# Patient Record
(demographics unavailable — no encounter records)

---

## 2024-11-18 NOTE — HISTORY OF PRESENT ILLNESS
[FreeTextEntry1] : He denies any chest pain. No sob. Not exercise.  Both knees give him pain. He needs L TKR. Not able exercise. .His ccta 10/24 LAD 30% Myocardial Bridging. was positive. Cardiac cath

## 2024-11-18 NOTE — REVIEW OF SYSTEMS
Instructions: This plan will send the code FBSD to the PM system.  DO NOT or CHANGE the price. Price (Do Not Change): 0.00 Detail Level: Simple [Fever] : no fever [Chills] : no chills [Blurry Vision] : no blurred vision [Earache] : no earache [Sore Throat] : no sore throat [SOB] : no shortness of breath [Chest Discomfort] : no chest discomfort [Palpitations] : no palpitations [Cough] : no cough [Wheezing] : no wheezing [Abdominal Pain] : no abdominal pain [Diarrhea] : diarrhea [Constipation] : no constipation [Knee Pain] : knee pain [Rash] : no rash [Skin Lesions] : no skin lesions [Dizziness] : no dizziness [Weakness] : no weakness [Confusion] : no confusion was observed [Swollen Glands] : no swollen glands

## 2024-11-18 NOTE — DISCUSSION/SUMMARY
[FreeTextEntry1] : H/o HTN . DSE 8/19 neg EF 55% MIld LVH. Grade 1 diastolic dysfunction. He may have LIANNA.  Told he needs  wt reduction. Told exercise when able .  Dr Hutchinson does blood. Had covid 12/22. He needs l TKR.  Last echo 1/24  EF 65% Ascending aorta 3.8 cm . EKG reviewed.   Legs swollen. Reviewed low na diet. He eats pizza cheese ETC. He does not snore.Told he needs  weight loss. CCTA positive cad. Cardiac Catherization  10/24. LAD 30  percent, with bridging. Cardiac risk ortho surgery intermediate. Bloods outside reviewed Time spent 35 minutes

## 2025-01-08 NOTE — HISTORY OF PRESENT ILLNESS
[FreeTextEntry1] : Quinn is a 63-year-old with history of kidney stones.  Last seen urology in 2018 after passing a 6 x 5 x 8 left ureteral stone.  He had nonobstructing stones as well at this time and a 1.5 cm left renal hypodensity that measures simple fluid, likely renal cyst.  Presents to office today to establish care and for his initial consultation for lower urinary tract symptoms and erectile dysfunction.  States that he has sensation of incomplete bladder emptying.  He denies any dysuria and gross hematuria.  Nocturia 1-3 times at night.  Prostate volume in 2018 was 39 cc.  Reports that he is able to get an erection but has difficulty maintaining.  States that his erectile dysfunction has been worsening over the last year.  He recently had cardiac workup including catheterization which showed a 30% blockage.  He continues to follow cardiology. Patient denies any chest pain or shortness of breath.  He denies being prescribed or taking any nitroglycerin medication.  PSA 1.8 ng/mL August 2024.

## 2025-01-08 NOTE — ASSESSMENT
[FreeTextEntry1] : Quinn is a 63-year-old with kidney stones, chronic BPH, chronic lower urinary tract symptoms, and chronic erectile dysfunction.  PSA reviewed.  Repeat in 1 year.  Options for ED reviewed.  Causes of ED reviewed.  Plan -Trial tadalafil 5 mg daily for BPH, LUTS, and erectile dysfunction.  Rx printed so patient can shop for cheaper out-of-pocket pharmacies have insurance does not cover. -Kidney bladder sonogram in office in 6 weeks same-day review to evaluate BPH, and kidney stones -Total testosterone ordered [Urinary Symptom or Sign (788.99\R39.89)] : implantation

## 2025-02-06 NOTE — HISTORY OF PRESENT ILLNESS
[de-identified] : s/p Left TKA doing well postop course uncomplicated, home PT complete, progressing appropriately, now ready for OP PT.  pain controlled (-)n/v/cp/sob  Left knee exam shows well healed incision NTTP AROM 2-90 negative nava  Imaging:  AP and Lateral views were obtained of the knees, including the contralateral knee for comparison. X-rays are negative for acute bone or soft tissue trauma. Left knee replacement in good alignment without radiographic evidence of complication.  Plan: continue home exercise activities as tolerated OP PT continue dvt prophylaxis f/u at 6 weeks.

## 2025-02-25 NOTE — HISTORY OF PRESENT ILLNESS
[FreeTextEntry1] : Patient with history of a left kidney cyst underwent new sonogram today showing bilateral kidney cysts 1 is simple in nature 1.5 cm on the right and on the left there is a lobulated appearing large cyst approximately 4.4 cm.  Lower urinary tract symptoms, sensation of incomplete bladder emptying.  He was started on tadalafil and does not perceive improvement.  Today's sonogram shows no significant PVR.  Prostate moderately enlarged 48 cc.  Difficulty maintaining erections.  He is taking daily tadalafil and has not been able to assess erections since starting it.  He is also taking it for lower urinary tract symptoms.  Testosterone is 194 which is low but his free testosterone is normal at 48.

## 2025-02-25 NOTE — ASSESSMENT
[FreeTextEntry1] : Lobulated cystic structure in the left kidney.  Will get CT scan pre and post IV contrast to better evaluate.  Return to office to discuss results.  Adequate bladder emptying on tadalafil 5 mg daily which he will continue. Moderate BPH is a chronic condition requiring longitudinal follow-up. Erectile dysfunction with low total testosterone.  Patient does not want to pursue any treatment of this.  He may pursue treatment after the CT scan is done. [Urinary Symptom or Sign (788.99\R39.89)] : implantation

## 2025-03-27 NOTE — HISTORY OF PRESENT ILLNESS
[de-identified] : Follow-up of left knee replacement.  Doing well.  Says the knee feels stiff but no significant pain.  Starting physical therapy.  On exam I feel that he could still use about 2 to 3 degrees of terminal extension.  Flexion seems to be good and improving.  The knee overall looks good, no signs of infection, no areas of pain.  Recommend continue PT and follow-up in April, at that point he can probably DC PT and return to work.

## 2025-04-02 NOTE — ASSESSMENT
[FreeTextEntry1] : Solid right renal lesion.  Will refer to Dr. Gwen Rae for consideration of robotic partial nephrectomy.  Will also get MRI of the liver to assess liver lesions and CT chest for small pulmonary nodules.  Lower urinary tract symptoms.  Continue tadalafil.  BPH.  Will continue to monitor PSA in the future along with symptoms. [Urinary Symptom or Sign (788.99\R39.89)] : implantation

## 2025-04-02 NOTE — HISTORY OF PRESENT ILLNESS
[FreeTextEntry1] : 63-year-old with recent sonogram showing bilateral renal cysts which on my review appeared lobulated.  CT kidneys pre and postcontrast were obtained showing a right lower to mid pole 3 cm solid tumor.  I reviewed the images.  He also has a left kidney cyst.  Also on the CT scan with some liver lesions and pulmonary nodules.  He has lower urinary tract symptoms being treated with tadalafil 5 mg daily.  There is no significant PVR on sonogram while on tadalafil and prostate is 48 cc enlarged.  History of erectile dysfunction.

## 2025-04-03 NOTE — ADDENDUM
[FreeTextEntry1] : Patient's note was transcribed with the assistance of a medical scribe under the supervision of Dr. Aguilera. I, Dr. Aguilera, have reviewed the patient's chart and agree that it aligns with my medical decisions. Bib Borjas, our scribe, also served as a chaperone for physical examination purposes.  The submitted E/M billing level for this visit reflects the total time spent on the day of the visit including face-to-face time spent with the patient, non-face-to-face review of medical records and relevant information, documentation, and asynchronous communication with the patient after a visit via phone, email, or patients EHR portal after the visit.  The medical records reviewed are either scanned into the chart or reviewed with the patient using a patients electronic medical records portal for patients with records not available to [facility] via electronic transmission platforms from other institutions and labs.  Time spend counseling and performing coordination of care was also included in determining the appropriate EM billing level.

## 2025-04-03 NOTE — PHYSICAL EXAM
[Normal Appearance] : normal appearance [Well Groomed] : well groomed [General Appearance - In No Acute Distress] : no acute distress [Edema] : no peripheral edema [Respiration, Rhythm And Depth] : normal respiratory rhythm and effort [Exaggerated Use Of Accessory Muscles For Inspiration] : no accessory muscle use [Abdomen Soft] : soft [Abdomen Tenderness] : non-tender [Costovertebral Angle Tenderness] : no ~M costovertebral angle tenderness [Normal Station and Gait] : the gait and station were normal for the patient's age [] : no rash [No Focal Deficits] : no focal deficits [Oriented To Time, Place, And Person] : oriented to person, place, and time [Affect] : the affect was normal [Mood] : the mood was normal [de-identified] : mild to moderate protuberant abdomen. no scars.

## 2025-04-03 NOTE — HISTORY OF PRESENT ILLNESS
[FreeTextEntry1] : ELSI EVANS is a 63-year-old male w bph/luts on cialis daily, nephrolithiasis and uric acid stones on allopurinol, presents for consultation for right renal mass, identified on ultrasound by Dr. Bajwa  Pt reports sensation of incomplete bladder emptying, weak stream and dribbling. He denies nocturia.  States he takes aspirin 81 mg for preventative reasons. Denies flank pain, gross hematuria, dysuria or associated symptoms.   CTA AP w/wo IV Cont 03/27/2025 - images independently reviewed by me - He has a well circumscribed fairly homogenous left posterior mostly endophytic with small exophytic components renal mass. Tumor is abutting the collecting system. There is also a RUP simple appearing cyst, scattered stones bilaterally kidneys. RMP largest 6mm and LMP 8mm. On my read, this appears to be a new mass compared to 2018, pt had a CT No Cont therefore difficult to fully assess however no obvious mass seen.  IMPRESSION:  A 3.6 cm enhancing right interpolar renal mass is compatible with a renal cortical neoplasm.  Few indeterminate hepatic lesions. Further evaluation with a contrast enhanced MRI is recommended. Non-obstructing bilateral intrarenal calculi. Splenomegaly. Few pulmonary nodules measuring up to 4 mm. (KIDNEYS: A 3.0 x 2.6 x 3.6 cm circumscribed homogeneously enhancing right posterior interpolar region mass noted (series 308, image 191). Bilateral nonobstructing renal calculi, largest measuring 6 mm in the left lower pole. Bilateral renal cysts and subcentimeter hypodensities too small to further characterize. No hydronephrosis. Simple right renal artery and patent single right renal vein.)  Cr 01/2024 - 1.4 GFR 56  Cr 03/2025 - 1.1 GFR 75  UA 04/02/2025 - negative.     history: Denies previous kidney stones, recurrent UTIs. No instrumentation. Family History: denies  malignancies. Social History: ,  PSHx: total knee replacement, no abdominal surgeries. Old records reviewed:

## 2025-04-03 NOTE — ASSESSMENT
[FreeTextEntry1] : ELSI EVANS is a 63-year-old male w bph/luts on cialis daily, nephrolithiasis and uric acid stones on allopurinol, presents for consultation for small right renal mass, identified on ultrasound by Dr. Bajwa  Also with nonspecific lung nodules and liver lesions.  We discussed robotic partial nephrectomy and he elects to book this procedure, understands the risks of the procedure as well as the possibility of radical nephrectomy.  Patient declined ablation.  Tumor is somewhat close to the ureter and therefore percutaneous ablation may not be a good option.  We also discussed active surveillance however given the tumor size I did not not recommend this and patient agreed. - Scheduled for CT Chest No Cont  + MR Abdomen to evaluate lung and liver nodules. Will f/u in 3-4 weeks to discuss results + will bring in wife next visit.   I messaged raissa to facilitate CT chest   Renal mass discussion: The dx of renal mass was discussed in great detail.  We discussed the fact that enhancement within a renal mass suggests malignancy, but that a benign renal tumor cannot be excluded.  Overall the risks of CA appears to be about 80%.  We discussed the problems with renal bx, the limited indications, and the need to treat based primarily on radiographic findings.  The patient appears to understand that there is about a 20% chance that this mass may be benign. We discussed the pros and cons of renal mass biopsy. We also discussed that active surveillance of renal masses (typically <3 cm) is a perfectly reasonable option as stated in the AUA guidelines.   Regarding treatment, we discussed radical nephrectomy vs. partial nephrectomy. With respect to P Nx we discussed a potential advantage with renal function long term.  We discussed the risk risk of urinoma, bleeding, infection, possible need for reoperation, local recurrence.  We also discussed the potential need for Rad Nx dependent on intraoperative findings.  For both R Nx and P Nx, we discussed risk of any major surgery, including but not limited to MI, CVA, DVT, infection, blood transfusion, wound healing problems, injury to surrounding structures (i.e. including but not limited to bowel or other adjacent organs), positioning injuries.  All of these issues were reviewed.  We discussed risk of cancer recurrence and potential need for additional therapy.  We also discussed risk of renal insufficiency and dialysis short and long-term with R Nx.  For partial nephrectomy, we discussed possible need to convert to R Nx dependent on intraoperative findings and anatomy.  We also discussed open vs. laparoscopic/robotic surgery and advantages and disadvantages each way. For laparoscopic/robotic surgery, we discussed possibility that conversion to open surgery might be required dependent on intraoperative findings and anatomy.    We also discussed renal ablative therapies such as cryo Rx and RFA.  We reviewed the data for these modalities and the overall encouraging findings thus far, but also discussed the fact that long-term cancer control issues remain unproven due to the still somewhat novel status of these modalities.  In summary, we discussed the procedure, risks, potential benefits, and reasonable alternatives. All questions were answered.   I asked this patient to call if any additional questions or concerns.

## 2025-04-11 NOTE — HISTORY OF PRESENT ILLNESS
[FreeTextEntry1] : He denies any chest pain. No sob. Not exercise.  Both knees give him pain. He had TKR 1/13/25. Not able exercise. .His ccta 10/24 LAD 30% Myocardial Bridging. was positive.

## 2025-04-11 NOTE — DISCUSSION/SUMMARY
[FreeTextEntry1] : H/o HTN . DSE 8/19 neg EF 55% MIld LVH. Grade 1 diastolic dysfunction. He may have LIANNA.  Told he needs  more   wt reduction. He lost 12 llbs. Told exercise when able .  Dr Hutchinson does blood. Had covid 12/22. .  Last echo 1/24  EF 65% Ascending aorta 3.8 cm . EKG reviewed.   Reviewed low na diet. He eats pizza cheese ETC. He does not snore. CCTA positive cad. Cardiac Catherization  10/24. LAD 30  percent, with bridging. Cardiac risk renal  surgery intermediate. Continue weight loss.  Time spent 30 minutes

## 2025-04-11 NOTE — REVIEW OF SYSTEMS
[Fever] : no fever [Chills] : no chills [Blurry Vision] : no blurred vision [Earache] : no earache [Sore Throat] : no sore throat [SOB] : no shortness of breath [Chest Discomfort] : no chest discomfort [Palpitations] : no palpitations [Cough] : no cough [Wheezing] : no wheezing [Abdominal Pain] : no abdominal pain [Diarrhea] : diarrhea [Constipation] : no constipation [Knee Pain] : knee pain [Rash] : no rash [Skin Lesions] : no skin lesions [Dizziness] : no dizziness [Weakness] : no weakness [Confusion] : no confusion was observed [Swollen Glands] : no swollen glands

## 2025-04-24 NOTE — HISTORY OF PRESENT ILLNESS
[de-identified] : f/u of left knee doing well will continue PT needs kidney surgery will f/u in 3 months.

## 2025-05-15 NOTE — ASSESSMENT
[FreeTextEntry1] : ELSI EVANS is a 63-year-old male w bph/luts on cialis daily, nephrolithiasis and uric acid stones on allopurinol, presented for consultation for small right renal mass, identified on ultrasound by Dr. Bajwa.  Cross-sectional imaging confirms enhancing renal mass.  s/p robotic right radical nephrectomy. concern for perinephric fat invasion and therefore converted to radical 5/7/2025 - pathology- oncocytoma   Plan: -pt desires a 6 week follow up for re-assessment to return to work -he will need continued monitoring of his remaining kidney which has stones in it

## 2025-05-15 NOTE — PHYSICAL EXAM
[General Appearance - In No Acute Distress] : no acute distress [] : no respiratory distress [Abdomen Soft] : soft [Abdomen Tenderness] : non-tender [Normal Station and Gait] : the gait and station were normal for the patient's age [Skin Color & Pigmentation] : normal skin color and pigmentation [No Focal Deficits] : no focal deficits [Oriented To Time, Place, And Person] : oriented to person, place, and time [FreeTextEntry1] : vital signs stable, afebrile  [de-identified] : surgical incisions intact, no evidence of infection [de-identified] : no calf tenderness

## 2025-05-15 NOTE — HISTORY OF PRESENT ILLNESS
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 Bajwa  s/p robotic right radical nephrectomy. concern for perinephric fat invasion and therefore converted to radical 5/7/2025 here for post op visit pt doing ok c/o "soreness" to right flank denies fevers or hematuria +appetite +flatus/BM   Pathology- 5/7/2025- Final Diagnosis Right kidney, radical nephrectomy: -  Renal oncocytoma. -  Tumor is confined to the kidney. -  Renal artery vein and ureter are unremarkable.  -  Kidney with chronic pyelonephritis. -  Benign renal cyst. - Adrenal gland is not identified.  Previous data:  PVR  05/06/2025 - 23  MR Abdomen 04/04/2025 - images independently reviewed by me -suspicious mass.  Posterior IMPRESSION: Homogeneously enhancing right renal midpole 3.0 x 2.8 x 3.2 cm enhancing mass consistent with renal cortical neoplasm. No lymphadenopathy. Hepatic cysts. No enhancing hepatic mass. Splenomegaly, unchanged.  CT Chest 04/09/2025 - IMPRESSION: Scattered nonspecific pulmonary nodules, none larger than 5 mm. Yearly follow-up suggested.  previously CTA AP w/wo IV Cont 03/27/2025 - images independently reviewed by me - He has a well circumscribed fairly homogenous left posterior mostly endophytic with small exophytic components renal mass. Tumor is abutting the collecting system. There is also a RUP simple appearing cyst, scattered stones bilaterally kidneys. RMP largest 6mm and LMP 8mm. On my read, this appears to be a new mass compared to 2018, pt had a CT No Cont therefore difficult to fully assess however no obvious mass seen.  IMPRESSION:  A 3.6 cm enhancing right interpolar renal mass is compatible with a renal cortical neoplasm.  Few indeterminate hepatic lesions. Further evaluation with a contrast enhanced MRI is recommended. Non-obstructing bilateral intrarenal calculi. Splenomegaly. Few pulmonary nodules measuring up to 4 mm. (KIDNEYS: A 3.0 x 2.6 x 3.6 cm circumscribed homogeneously enhancing right posterior interpolar region mass noted (series 308, image 191). Bilateral nonobstructing renal calculi, largest measuring 6 mm in the left lower pole. Bilateral renal cysts and subcentimeter hypodensities too small to further characterize. No hydronephrosis. Simple right renal artery and patent single right renal vein.)  Cr 01/2024 - 1.4 GFR 56  Cr 03/2025 - 1.1 GFR 75  UA 04/02/2025 - negative.    history: Denies previous kidney stones, recurrent UTIs. No instrumentation. Family History: denies  malignancies. Social History:  PSHx: total knee replacement, no abdominal surgeries.

## 2025-06-24 NOTE — HISTORY OF PRESENT ILLNESS
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 Bajwa. 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  Pathology demonstrates oncocytoma.  Overall doing well.  Recovering well without any complications.  History of uric acid stones, currently on allopurinol.  Previously:  Pathology- 5/7/2025- Final Diagnosis Right kidney, radical nephrectomy: -  Renal oncocytoma. -  Tumor is confined to the kidney. -  Renal artery vein and ureter are unremarkable.  -  Kidney with chronic pyelonephritis. -  Benign renal cyst. - Adrenal gland is not identified.  Previous data:  PVR  05/06/2025 - 23  MR Abdomen 04/04/2025 - images independently reviewed by me -suspicious mass.  Posterior IMPRESSION: Homogeneously enhancing right renal midpole 3.0 x 2.8 x 3.2 cm enhancing mass consistent with renal cortical neoplasm. No lymphadenopathy. Hepatic cysts. No enhancing hepatic mass. Splenomegaly, unchanged.  CT Chest 04/09/2025 - IMPRESSION: Scattered nonspecific pulmonary nodules, none larger than 5 mm. Yearly follow-up suggested.  previously CTA AP w/wo IV Cont 03/27/2025 - images independently reviewed by me - He has a well circumscribed fairly homogenous left posterior mostly endophytic with small exophytic components renal mass. Tumor is abutting the collecting system. There is also a RUP simple appearing cyst, scattered stones bilaterally kidneys. RMP largest 6mm and LMP 8mm. On my read, this appears to be a new mass compared to 2018, pt had a CT No Cont therefore difficult to fully assess however no obvious mass seen.  IMPRESSION:  A 3.6 cm enhancing right interpolar renal mass is compatible with a renal cortical neoplasm.  Few indeterminate hepatic lesions. Further evaluation with a contrast enhanced MRI is recommended. Non-obstructing bilateral intrarenal calculi. Splenomegaly. Few pulmonary nodules measuring up to 4 mm. (KIDNEYS: A 3.0 x 2.6 x 3.6 cm circumscribed homogeneously enhancing right posterior interpolar region mass noted (series 308, image 191). Bilateral nonobstructing renal calculi, largest measuring 6 mm in the left lower pole. Bilateral renal cysts and subcentimeter hypodensities too small to further characterize. No hydronephrosis. Simple right renal artery and patent single right renal vein.)  Cr 01/2024 - 1.4 GFR 56  Cr 03/2025 - 1.1 GFR 75  UA 04/02/2025 - negative.    history: Denies previous kidney stones, recurrent UTIs. No instrumentation. Family History: denies  malignancies. Social History:  PSHx: total knee replacement, no abdominal surgeries.

## 2025-06-24 NOTE — ADDENDUM
[FreeTextEntry1] : Patient's note was transcribed by physician assistant Rikki Le, under the supervision of Dr. Aguilera. I, Dr. Aguilera, have reviewed the patient's chart and agree that it aligns with my medical decisions.  The submitted E/M billing level for this visit reflects the total time spent on the day of the visit including face-to-face time spent with the patient, non-face-to-face review of medical records and relevant information, documentation, and asynchronous communication with the patient after a visit via phone, email, or patients EHR portal after the visit.  The medical records reviewed are either scanned into the chart or reviewed with the patient using a patients electronic medical records portal for patients with records not available to [facility] via electronic transmission platforms from other institutions and labs.  Time spend counseling and performing coordination of care was also included in determining the appropriate EM billing level.

## 2025-06-24 NOTE — ADDENDUM
[FreeTextEntry1] : Patient's note was transcribed by physician assistant Rikki Le, under the supervision of Dr. Aguilera. I, Dr. Aguilera, have reviewed the patient's chart and agree that it aligns with my medical decisions.  The submitted E/M billing level for this visit reflects the total time spent on the day of the visit including face-to-face time spent with the patient, non-face-to-face review of medical records and relevant information, documentation, and asynchronous communication with the patient after a visit via phone, email, or patients EHR portal after the visit.  The medical records reviewed are either scanned into the chart or reviewed with the patient using a patients electronic medical records portal for patients with records not available to [facility] via electronic transmission platforms from other institutions and labs.  Time spend counseling and performing coordination of care was also included in determining the appropriate EM billing level.

## 2025-06-24 NOTE — ASSESSMENT
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 Bajwa. 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  -Continue allopurinol for stones.  Has stones in remaining kidney. Recommending reassessment of nephrolithiasis with CT abdomen pelvis low-dose next visit.  Given the stones previously were sizable can consider surgical management versus dissolution versus observation - Stone prevention reviewed in detail Continue daily Cialis for BPH LUTS - Follow-up 6 months PSA/CMP